# Patient Record
(demographics unavailable — no encounter records)

---

## 2025-02-26 NOTE — ASSESSMENT
[FreeTextEntry1] :  Annual Physical Exam: Obesity (BMI 30-39.9) - BP is stable. Continue current management. - Check A1c, CBC, CMP, Lipid profile, Vitamin levels, Urinalysis, TSH - Start Zepbound 2.5MG - Order Mammogram Digital Screening, CT Chest Lung Cancer Screening, DEXA Bone Density Axial    - RTO annually or as needed.   Pt verbalized understanding and will reach should any questions/concerns occur.

## 2025-02-26 NOTE — HISTORY OF PRESENT ILLNESS
[FreeTextEntry1] : Patient is present today to establish care and for a comprehensive Annual Physical Exam. [de-identified] : Patient is a 64yr old female who is present today to establish care and for a comprehensive Annual Physical Exam.  Patient is doing well overall. Patient denies any illnesses or changes in medications since last PCP visit. Pt reports [facility] wanted her to use Lipitor, denies using due to side effects (unspecified) after taking two pills. Confirms everything stable at [facility] otherwise. Pt also c/o weight gain, denies snacking and confirms sleeping well. Reports she has been following with the gym and eating healthy, unaware of why she is gaining weight. Confirms gallbladder was removed. Denies FHx of thyroid cancer. Pt requests Bone Density, Mammogram and CT scan prescription.  Pt also requests refills of Lexapro and Amlodipine. Confirms mood is stable. Declines interest in Shingles and Flu vaccine. Discussed weight loss medication (Zepbound) and side effects surrounding usage.   Denies any CP, chest tightness or SOB. Denies any abdominal pain, urinary symptom, or change in bowel habits. Denies any fever, chills, or night sweats.

## 2025-02-26 NOTE — COUNSELING
[Encouraged to increase physical activity] : Encouraged to increase physical activity [FreeTextEntry4] : 15 mins [ - Annual Lung Cancer Screening/Share Decision Making Discussion] : Annual Lung Cancer Screening/Share Decision Making Discussion. (I have advised this patient to have a Low Dose CT (LDCT) scan of the lungs and have discussed the following with the patient in a shared decision making discussion:   Benefits of Detection and Early Treatment: There is adequate evidence that annual screening for lung cancer with LDCT in a population of high-risk persons can prevent a substantial number of lung cancer-related deaths. The magnitude of benefit depends on the individual patient's risk for lung cancer, as those who are at highest risk are most likely to benefit. Screening cannot prevent most lung cancer-related deaths, and does not replace smoking cessation. Harms of Detection and Early Intervention and Treatment: The harms associated with LDCT screening include false-negative and false-positive results, incidental findings, over diagnosis, and radiation exposure. False-positive LDCT results occur in a substantial proportion of screened persons; 95% of all positive results do not lead to a diagnosis of cancer. In a high-quality screening program, further imaging can resolve most false-positive results; however, some patients may require invasive procedures. Radiation harms, including cancer resulting from cumulative exposure to radiation, vary depending on the age at the start of screening; the number of scans received; and the person's exposure to other sources of radiation, particularly other medical imaging.)

## 2025-02-26 NOTE — ADDENDUM
[FreeTextEntry1] : I, Agustín Chin, acted as a scribe on behalf of Dr. Loyd Salomon MD, on 02/26/2025.   All medical entries made by the scribe were at my, Dr. Loyd Salomon MD, direction and personally dictated by me on 02/26/2025. I have reviewed the chart and agree that the record accurately reflects my personal performance of the history, physical exam, assessment and plan. I have also personally directed, reviewed, and agreed with the chart.

## 2025-03-05 NOTE — HISTORY OF PRESENT ILLNESS
[No Pertinent Cardiac History] : no history of aortic stenosis, atrial fibrillation, coronary artery disease, recent myocardial infarction, or implantable device/pacemaker [No Pertinent Pulmonary History] : no history of asthma, COPD, sleep apnea, or smoking [No Adverse Anesthesia Reaction] : no adverse anesthesia reaction in self or family member [Chronic Anticoagulation] : no chronic anticoagulation [Chronic Kidney Disease] : no chronic kidney disease [Diabetes] : no diabetes [Good (7-10 METs)] : Good (7-10 METs) [FreeTextEntry1] : BUL Ptosis repair w/ Bundled Blepharoplasty with internal brow repair [FreeTextEntry2] : 03/14/25 [FreeTextEntry3] : Dr. Lou  [FreeTextEntry4] : Patient is a 64yr old female who is present today for medical clearance   Pt reports no known allergies to anesthesia, Hx of complications, or Hx of difficult to control bleeding. Pt made aware to stop NSAIDs and omega-3s one week prior to procedure. Pt reports able to walk up two flights of steps without SOB.

## 2025-03-05 NOTE — ASSESSMENT
[Patient Optimized for Surgery] : Patient optimized for surgery [FreeTextEntry4] : Follow up visit: - BP is stable. Continue current management. -Check APTT, Anti-Nuclear Antibody, CBC, Flow Cytometry, Folate, Prothrombin Time and INR-Given the low white blood cell count Discussed the diagnosis of osteoporosis discussed different treatment options does exercise, opted to monitor.   - RTO in 3 months     Pt verbalized understanding and will reach should any questions/concerns occur.

## 2025-03-05 NOTE — HISTORY OF PRESENT ILLNESS
[FreeTextEntry1] : Patient doing well No acute issues  She lost 8% bone mineral density in the right hip She is seeing her IM doctor to discuss [Mammogramdate] : 2025 [PapSmeardate] : 2024 [BoneDensityDate] : 2025 [ColonoscopyDate] : UTD

## 2025-03-05 NOTE — ADDENDUM
[FreeTextEntry1] : I, Agustín Chin, acted as a scribe on behalf of Dr. Loyd Salomon MD, on 03/05/2025.   All medical entries made by the scribe were at my, Dr. Loyd Salomon MD, direction and personally dictated by me on 03/05/2025. I have reviewed the chart and agree that the record accurately reflects my personal performance of the history, physical exam, assessment and plan. I have also personally directed, reviewed, and agreed with the chart.

## 2025-03-05 NOTE — PLAN
[FreeTextEntry1] : Discuss bone density patient does exercise alot Will speak with IM about possible medication   Patient screened for depression. No signs of clinical depression. PHQ-9 scores reviewed over the course of the visit and 5-10 minutes of face to face time spent. Follow up with changes in mood including other symptoms of anxiety.